# Patient Record
Sex: MALE | URBAN - METROPOLITAN AREA
[De-identification: names, ages, dates, MRNs, and addresses within clinical notes are randomized per-mention and may not be internally consistent; named-entity substitution may affect disease eponyms.]

---

## 2018-10-05 ENCOUNTER — TRANSCRIBE ORDERS (OUTPATIENT)
Dept: LAB | Facility: HOSPITAL | Age: 18
End: 2018-10-05

## 2018-10-05 ENCOUNTER — APPOINTMENT (OUTPATIENT)
Dept: LAB | Facility: HOSPITAL | Age: 18
End: 2018-10-05
Payer: COMMERCIAL

## 2018-10-05 DIAGNOSIS — Z11.59 SCREENING EXAMINATION FOR POLIOMYELITIS: ICD-10-CM

## 2018-10-05 DIAGNOSIS — Z11.59 SCREENING EXAMINATION FOR POLIOMYELITIS: Primary | ICD-10-CM

## 2018-10-05 PROCEDURE — 86787 VARICELLA-ZOSTER ANTIBODY: CPT

## 2018-10-05 PROCEDURE — 36415 COLL VENOUS BLD VENIPUNCTURE: CPT

## 2018-10-09 LAB — VZV IGG SER IA-ACNC: NORMAL

## 2019-03-02 ENCOUNTER — APPOINTMENT (OUTPATIENT)
Dept: RADIOLOGY | Age: 19
End: 2019-03-02
Payer: COMMERCIAL

## 2019-03-02 ENCOUNTER — OFFICE VISIT (OUTPATIENT)
Dept: URGENT CARE | Age: 19
End: 2019-03-02
Payer: COMMERCIAL

## 2019-03-02 VITALS
WEIGHT: 210 LBS | HEIGHT: 72 IN | RESPIRATION RATE: 16 BRPM | BODY MASS INDEX: 28.44 KG/M2 | TEMPERATURE: 97.9 F | HEART RATE: 88 BPM | SYSTOLIC BLOOD PRESSURE: 130 MMHG | DIASTOLIC BLOOD PRESSURE: 80 MMHG | OXYGEN SATURATION: 97 %

## 2019-03-02 DIAGNOSIS — M79.641 RIGHT HAND PAIN: Primary | ICD-10-CM

## 2019-03-02 DIAGNOSIS — M79.641 RIGHT HAND PAIN: ICD-10-CM

## 2019-03-02 PROCEDURE — 73130 X-RAY EXAM OF HAND: CPT

## 2019-03-02 PROCEDURE — 99283 EMERGENCY DEPT VISIT LOW MDM: CPT | Performed by: FAMILY MEDICINE

## 2019-03-02 PROCEDURE — G0382 LEV 3 HOSP TYPE B ED VISIT: HCPCS | Performed by: FAMILY MEDICINE

## 2019-03-02 RX ORDER — DEXTROAMPHETAMINE SACCHARATE, AMPHETAMINE ASPARTATE MONOHYDRATE, DEXTROAMPHETAMINE SULFATE AND AMPHETAMINE SULFATE 7.5; 7.5; 7.5; 7.5 MG/1; MG/1; MG/1; MG/1
30 CAPSULE, EXTENDED RELEASE ORAL DAILY
Refills: 0 | COMMUNITY
Start: 2019-02-04

## 2019-03-02 RX ORDER — MONTELUKAST SODIUM 10 MG/1
TABLET ORAL
COMMUNITY
Start: 2019-02-26

## 2019-03-03 NOTE — PATIENT INSTRUCTIONS
No acute injury on x-ray  I would recommend following with Orthopedics for continued pain and locking of hand  You can use Motrin as needed  You can use ice for acute pain  Follow up with Orthopedics as discussed

## 2019-03-03 NOTE — PROGRESS NOTES
[facility]        NAME: Corey Mendiola is a 19 y o  male  : 2000    MRN: [id number]  DATE: 2019  TIME: 7:34 PM    Assessment and Plan   Right hand pain [M79 [id number]]  1  Right hand pain  XR hand 3+ vw right         Patient Instructions     Patient Instructions   No acute injury on x-ray  I would recommend following with Orthopedics for continued pain and locking of hand  You can use Motrin as needed  You can use ice for acute pain  Follow up with Orthopedics as discussed  Chief Complaint     Chief Complaint   Patient presents with    Other     right hand pain and numbness   " on and off for 1 year after hitting a wall with his fist" per pt         History of Present Illness   Corey Mendiola presents to the clinic c/o    This is a 20-year-old male here today with complaints injury to right hand about 1 year ago  He states he continues to have intermittent pain at the MCP joint of the 4th finger  He states when he touches this joint or had that again something there is pain  He states on several occasions he has made a fist and his hand has locked  He also states that some mornings when he wakes up the hand is numb the numbness resolved after 30 minutes to an hour  He does states that he sleeps with his hands cranial in his head and neck  He states he did see his  for this and then his family doctor who gave him medication but no further follow-up after that  He states he has never had any imaging done for the injury  Review of Systems   Review of Systems   Constitutional: Negative  Musculoskeletal: Positive for arthralgias  Neurological: Negative  Psychiatric/Behavioral: Negative            Current Medications     Long-Term Medications   Medication Sig Dispense Refill    amphetamine-dextroamphetamine (ADDERALL XR) 30 MG 24 hr capsule Take 30 mg by mouth daily  0    montelukast (SINGULAIR) 10 mg tablet          Current Allergies     Allergies as of 03/02/2019    (No Known Allergies)            The following portions of the patient's history were reviewed and updated as appropriate: allergies, current medications, past family history, past medical history, past social history, past surgical history and problem list     Objective   /80 (BP Location: Right arm, Patient Position: Sitting, Cuff Size: Standard)   Pulse 88   Temp 97 9 °F (36 6 °C) (Temporal)   Resp 16   Ht 6' (1 829 m)   Wt 95 3 kg (210 lb)   SpO2 97%   BMI 28 48 kg/m²        Physical Exam     Physical Exam   Constitutional: He is oriented to person, place, and time  He appears well-developed and well-nourished  Pulmonary/Chest: Effort normal    Musculoskeletal:   Right hand:  Mild tenderness to palpate over the 4th MCP  Full range of motion of the hand  Good grasp strength  No bruising or swelling   Neurological: He is alert and oriented to person, place, and time  Skin: Skin is warm and dry  Psychiatric: He has a normal mood and affect  His behavior is normal    Nursing note and vitals reviewed        Right hand acute fracture